# Patient Record
Sex: FEMALE | Race: WHITE
[De-identification: names, ages, dates, MRNs, and addresses within clinical notes are randomized per-mention and may not be internally consistent; named-entity substitution may affect disease eponyms.]

---

## 2021-04-06 ENCOUNTER — HOSPITAL ENCOUNTER (EMERGENCY)
Dept: HOSPITAL 46 - ED | Age: 61
Discharge: HOME | End: 2021-04-06
Payer: MEDICARE

## 2021-04-06 VITALS — HEIGHT: 65 IN | WEIGHT: 163.06 LBS | BODY MASS INDEX: 27.17 KG/M2

## 2021-04-06 DIAGNOSIS — Z79.4: ICD-10-CM

## 2021-04-06 DIAGNOSIS — R50.9: ICD-10-CM

## 2021-04-06 DIAGNOSIS — I10: ICD-10-CM

## 2021-04-06 DIAGNOSIS — Z88.5: ICD-10-CM

## 2021-04-06 DIAGNOSIS — R00.0: ICD-10-CM

## 2021-04-06 DIAGNOSIS — E03.9: ICD-10-CM

## 2021-04-06 DIAGNOSIS — Z87.891: ICD-10-CM

## 2021-04-06 DIAGNOSIS — E86.0: ICD-10-CM

## 2021-04-06 DIAGNOSIS — Z20.822: ICD-10-CM

## 2021-04-06 DIAGNOSIS — R11.2: Primary | ICD-10-CM

## 2021-04-06 DIAGNOSIS — E11.9: ICD-10-CM

## 2021-04-06 DIAGNOSIS — Z79.899: ICD-10-CM

## 2021-04-06 PROCEDURE — C9803 HOPD COVID-19 SPEC COLLECT: HCPCS

## 2021-04-06 PROCEDURE — U0003 INFECTIOUS AGENT DETECTION BY NUCLEIC ACID (DNA OR RNA); SEVERE ACUTE RESPIRATORY SYNDROME CORONAVIRUS 2 (SARS-COV-2) (CORONAVIRUS DISEASE [COVID-19]), AMPLIFIED PROBE TECHNIQUE, MAKING USE OF HIGH THROUGHPUT TECHNOLOGIES AS DESCRIBED BY CMS-2020-01-R: HCPCS

## 2021-04-07 NOTE — EKG
[facility]
                                    [address]
                                  [place]  [address]
_________________________________________________________________________________________
                                                                 Signed   
 
 
Sinus tachycardia
Left axis deviation
Minimal voltage criteria for LVH, may be normal variant
Nonspecific ST abnormality
Abnormal ECG
When compared with ECG of 03-NOV-2016 19:33,
Vent. rate has increased BY  54 BPM
Criteria for Septal infarct are no longer present
T wave amplitude has increased in Anterior leads
Confirmed by TINO BULL MD ([id number]) on 4/7/2021 12:29:56 AM
 
 
 
 
 
 
 
 
 
 
 
 
 
 
 
 
 
 
 
 
 
 
 
 
 
 
 
 
 
 
 
 
 
 
 
    Electronically Signed By: TINO BULL MD  04/07/21 0030
_________________________________________________________________________________________
PATIENT NAME:     YANCI SOTO                    
MEDICAL RECORD #: [id number]                     Electrocardiogram             
          ACCT #: [id number]  
DATE OF BIRTH:   [birth year]                                       
PHYSICIAN:   TINO BULL MD                   REPORT #: [id number]
REPORT IS CONFIDENTIAL AND NOT TO BE RELEASED WITHOUT AUTHORIZATION

## 2021-09-18 ENCOUNTER — HOSPITAL ENCOUNTER (EMERGENCY)
Dept: HOSPITAL 46 - ED | Age: 61
Discharge: HOME | End: 2021-09-18
Payer: MEDICARE

## 2021-09-18 VITALS — BODY MASS INDEX: 24.68 KG/M2 | HEIGHT: 65 IN | WEIGHT: 148.15 LBS

## 2021-09-18 DIAGNOSIS — J01.90: Primary | ICD-10-CM

## 2021-09-18 DIAGNOSIS — Z79.899: ICD-10-CM

## 2021-09-18 DIAGNOSIS — I10: ICD-10-CM

## 2021-09-18 DIAGNOSIS — Z87.891: ICD-10-CM

## 2021-09-18 DIAGNOSIS — E11.9: ICD-10-CM

## 2021-09-18 DIAGNOSIS — Z88.5: ICD-10-CM

## 2021-09-18 DIAGNOSIS — Z20.822: ICD-10-CM

## 2021-09-18 DIAGNOSIS — E03.9: ICD-10-CM

## 2021-09-18 DIAGNOSIS — B96.89: ICD-10-CM

## 2021-09-18 PROCEDURE — C9803 HOPD COVID-19 SPEC COLLECT: HCPCS

## 2021-09-18 PROCEDURE — U0003 INFECTIOUS AGENT DETECTION BY NUCLEIC ACID (DNA OR RNA); SEVERE ACUTE RESPIRATORY SYNDROME CORONAVIRUS 2 (SARS-COV-2) (CORONAVIRUS DISEASE [COVID-19]), AMPLIFIED PROBE TECHNIQUE, MAKING USE OF HIGH THROUGHPUT TECHNOLOGIES AS DESCRIBED BY CMS-2020-01-R: HCPCS

## 2022-01-02 ENCOUNTER — HOSPITAL ENCOUNTER (EMERGENCY)
Dept: HOSPITAL 46 - ED | Age: 62
Discharge: HOME | End: 2022-01-02
Payer: MEDICARE

## 2022-01-02 VITALS — HEIGHT: 65 IN | BODY MASS INDEX: 27.5 KG/M2 | WEIGHT: 165.06 LBS

## 2022-01-02 DIAGNOSIS — E03.9: ICD-10-CM

## 2022-01-02 DIAGNOSIS — Z87.891: ICD-10-CM

## 2022-01-02 DIAGNOSIS — E11.9: ICD-10-CM

## 2022-01-02 DIAGNOSIS — N39.0: Primary | ICD-10-CM

## 2022-01-02 DIAGNOSIS — I10: ICD-10-CM

## 2022-01-02 DIAGNOSIS — Z20.822: ICD-10-CM

## 2022-01-02 DIAGNOSIS — Z88.5: ICD-10-CM

## 2022-01-02 DIAGNOSIS — Z79.899: ICD-10-CM

## 2022-01-02 PROCEDURE — C9803 HOPD COVID-19 SPEC COLLECT: HCPCS

## 2022-01-02 PROCEDURE — U0003 INFECTIOUS AGENT DETECTION BY NUCLEIC ACID (DNA OR RNA); SEVERE ACUTE RESPIRATORY SYNDROME CORONAVIRUS 2 (SARS-COV-2) (CORONAVIRUS DISEASE [COVID-19]), AMPLIFIED PROBE TECHNIQUE, MAKING USE OF HIGH THROUGHPUT TECHNOLOGIES AS DESCRIBED BY CMS-2020-01-R: HCPCS

## 2022-01-02 NOTE — XMS
PreManage Notification: YANCI SOTO MRN:[id number]
 
Security Information
 
Security Events
No recent Security Events currently on file
 
 
 
CRITERIA MET
------------
- [facility]
 
 
CARE PROVIDERS
There are no care providers on record at this time.
 
[facility] has no Care Guidelines for this patient.
 
[facility] VISIT COUNT (12 MO.)
-------------------------------------------------------------------------------------
3 [facility]
-------------------------------------------------------------------------------------
TOTAL 3
-------------------------------------------------------------------------------------
NOTE: Visits indicate total known visits.
 
ED/C VISIT TRACKING (12 MO.)
-------------------------------------------------------------------------------------
01/02/2022 10:41
[facility] [place]
 
TYPE: Emergency
 
COMPLAINT:
- LOWER ABDOMINAL PAIN
-------------------------------------------------------------------------------------
09/18/2021 16:01
[facility] [place]
 
TYPE: Emergency
 
COMPLAINT:
- COLD SYMPTOMS, FATIGUE
 
DIAGNOSES:
- Type 2 diabetes mellitus without complications
- Other specified bacterial agents as the cause of diseases classified elsewhere
- Essential (primary) hypertension
- Acute sinusitis, unspecified
- Hypothyroidism, unspecified
- Other long term (current) drug therapy
- Personal history of nicotine dependence
- Fever, unspecified
- Allergy status to narcotic agent
-------------------------------------------------------------------------------------
04/06/2021 15:45
[facility] [place]
 
 
TYPE: Emergency
 
COMPLAINT:
- NV
 
DIAGNOSES:
- Personal history of nicotine dependence
- Tachycardia, unspecified
- Long term (current) use of insulin
- Fever, unspecified
- Hypothyroidism, unspecified
- Vomiting, unspecified
- Essential (primary) hypertension
- Dehydration
- Fever, unspecified
- Type 2 diabetes mellitus without complications
- Other long term (current) drug therapy
- Allergy status to narcotic agent
- Nausea with vomiting, unspecified
-------------------------------------------------------------------------------------
 
 
INPATIENT VISIT TRACKING (12 MO.)
No inpatient visits to display in this time frame
 
[email or web address]